# Patient Record
Sex: FEMALE | Race: WHITE | Employment: FULL TIME | URBAN - METROPOLITAN AREA
[De-identification: names, ages, dates, MRNs, and addresses within clinical notes are randomized per-mention and may not be internally consistent; named-entity substitution may affect disease eponyms.]

---
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
## 2018-02-10 NOTE — ED INITIAL ASSESSMENT (HPI)
Pt arrived per EMS. States got in an argument with her daughter and said she wanted to kill self. Also sent multiple texts messages of the same to her daughter. States took 1 sleeping pill (Thinks it was trazadone) and 1 Naproxen.  Pt states she doesn't valeria

## 2018-02-11 NOTE — ED NOTES
Pt became very agitated when explained she was not being discharged, a P and C was filled out  And she would have to stay. Pt states \"you cannot hold me against my will. I am not crazy. \" Explained to pt that she is unable to leave on her own.   Pt states

## 2018-02-11 NOTE — ED PROVIDER NOTES
Patient Seen in: [facility] AND [facility] Emergency Department    History   Patient presents with:  Eval-P (psychiatric)    Stated Complaint: SI     HPI    58 yo F with PMH anxiety/depression, NIDDM, HTn presenting for evaluation of suicidal ideation - got into a complaint: SI  Other systems are as noted in HPI. Constitutional and vital signs reviewed. All other systems reviewed and negative except as noted above. PSFH elements reviewed from today and agreed except as otherwise stated in HPI.     Physical E RAINBOW DRAW LIGHT GREEN   RAINBOW DRAW GOLD   RAINBOW DRAW LAVENDER TALL (BNP)   CBC W/ DIFFERENTIAL       MDM     DIFFERENTIAL DIAGNOSIS: After history and physical exam differential diagnosis includes but is not limited to substance induced mood disor
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
## 2018-02-11 NOTE — ED NOTES
Received call from RN stating that pt would like to see a copy of the P&C. Spoke with Feliz Mauricio to confirm that I can provide the pt with a copy immediately. Feliz Mauricio confirmed that I can provide the pt with a copy of the petition and certificate.

## 2018-02-11 NOTE — ED NOTES
EMS present to transport patient. Pt remains calm and cooperative at the time of transfer. Security contacted for belongings.

## 2018-02-11 NOTE — ED NOTES
Pt resting comfortably on cart, denies any needs or complaints at this time. VSS. Will continue to monitor.

## 2018-02-11 NOTE — ED NOTES
MacNeal - Left Message  Jose Angel 23 - kept ringing, unable to leave message  Good Chaitanya - Left Message

## 2018-02-11 NOTE — ED NOTES
Pam - called for an update, having a hard time getting a hold of the doctor, will call back with any update    Razia - called for an update, still reviewing case, will call back with any updates

## 2018-02-11 NOTE — ED NOTES
[facility] has accepted patient, accepting doc is Dr Earnestine Layne.  Pt to be transferred after 6 am.

## 2018-02-11 NOTE — ED NOTES
Received call from pt's daughter but was unable to answer as writer was on the phone at the time. Attempted to call back. Left voicemail.

## 2018-02-11 NOTE — ED NOTES
Got a call from [facility]. She said that she will contact the intake and see if the accepting dr and bed can be assigned.

## 2019-09-12 ENCOUNTER — HOSPITAL ENCOUNTER (OUTPATIENT)
Dept: GENERAL RADIOLOGY | Facility: HOSPITAL | Age: 59
Discharge: HOME | End: 2019-09-12
Attending: ORTHOPAEDIC SURGERY
Payer: OTHER MISCELLANEOUS

## 2019-09-12 DIAGNOSIS — M25.561 RIGHT KNEE PAIN: ICD-10-CM

## 2019-09-12 PROCEDURE — 73564 X-RAY EXAM KNEE 4 OR MORE: CPT | Performed by: ORTHOPAEDIC SURGERY
